# Patient Record
Sex: MALE | Race: WHITE | NOT HISPANIC OR LATINO | Employment: FULL TIME | URBAN - METROPOLITAN AREA
[De-identification: names, ages, dates, MRNs, and addresses within clinical notes are randomized per-mention and may not be internally consistent; named-entity substitution may affect disease eponyms.]

---

## 2022-12-31 ENCOUNTER — APPOINTMENT (OUTPATIENT)
Dept: RADIOLOGY | Facility: CLINIC | Age: 37
End: 2022-12-31
Attending: EMERGENCY MEDICINE
Payer: COMMERCIAL

## 2022-12-31 ENCOUNTER — HOSPITAL ENCOUNTER (EMERGENCY)
Facility: CLINIC | Age: 37
Discharge: HOME | End: 2022-12-31
Attending: EMERGENCY MEDICINE | Admitting: EMERGENCY MEDICINE
Payer: COMMERCIAL

## 2022-12-31 VITALS
WEIGHT: 265 LBS | DIASTOLIC BLOOD PRESSURE: 88 MMHG | OXYGEN SATURATION: 97 % | TEMPERATURE: 98.8 F | BODY MASS INDEX: 32.95 KG/M2 | RESPIRATION RATE: 18 BRPM | HEART RATE: 75 BPM | SYSTOLIC BLOOD PRESSURE: 147 MMHG | HEIGHT: 75 IN

## 2022-12-31 DIAGNOSIS — R07.89 ATYPICAL CHEST PAIN: ICD-10-CM

## 2022-12-31 LAB
ALBUMIN SERPL-MCNC: 4.1 G/DL (ref 3.5–5)
ALP SERPL-CCNC: 81 U/L (ref 45–120)
ALT SERPL W P-5'-P-CCNC: 19 U/L (ref 0–45)
ANION GAP SERPL CALCULATED.3IONS-SCNC: 12 MMOL/L (ref 5–18)
AST SERPL W P-5'-P-CCNC: 16 U/L (ref 0–40)
BASOPHILS # BLD AUTO: 0 10E3/UL (ref 0–0.2)
BASOPHILS NFR BLD AUTO: 1 %
BILIRUB DIRECT SERPL-MCNC: 0.1 MG/DL
BILIRUB SERPL-MCNC: 0.4 MG/DL (ref 0–1)
BUN SERPL-MCNC: 8 MG/DL (ref 8–22)
CALCIUM SERPL-MCNC: 10.2 MG/DL (ref 8.5–10.5)
CHLORIDE BLD-SCNC: 103 MMOL/L (ref 98–107)
CO2 SERPL-SCNC: 26 MMOL/L (ref 22–31)
CREAT SERPL-MCNC: 0.85 MG/DL (ref 0.7–1.3)
EOSINOPHIL # BLD AUTO: 0.2 10E3/UL (ref 0–0.7)
EOSINOPHIL NFR BLD AUTO: 3 %
ERYTHROCYTE [DISTWIDTH] IN BLOOD BY AUTOMATED COUNT: 13 % (ref 10–15)
GFR SERPL CREATININE-BSD FRML MDRD: >90 ML/MIN/1.73M2
GLUCOSE BLD-MCNC: 179 MG/DL (ref 70–125)
HCT VFR BLD AUTO: 44.3 % (ref 40–53)
HGB BLD-MCNC: 15.3 G/DL (ref 13.3–17.7)
IMM GRANULOCYTES # BLD: 0 10E3/UL
IMM GRANULOCYTES NFR BLD: 1 %
LIPASE SERPL-CCNC: 46 U/L (ref 0–52)
LYMPHOCYTES # BLD AUTO: 1.7 10E3/UL (ref 0.8–5.3)
LYMPHOCYTES NFR BLD AUTO: 26 %
MAGNESIUM SERPL-MCNC: 1.6 MG/DL (ref 1.8–2.6)
MCH RBC QN AUTO: 30.5 PG (ref 26.5–33)
MCHC RBC AUTO-ENTMCNC: 34.5 G/DL (ref 31.5–36.5)
MCV RBC AUTO: 88 FL (ref 78–100)
MONOCYTES # BLD AUTO: 0.5 10E3/UL (ref 0–1.3)
MONOCYTES NFR BLD AUTO: 7 %
NEUTROPHILS # BLD AUTO: 4.1 10E3/UL (ref 1.6–8.3)
NEUTROPHILS NFR BLD AUTO: 62 %
NRBC # BLD AUTO: 0 10E3/UL
NRBC BLD AUTO-RTO: 0 /100
PLATELET # BLD AUTO: 264 10E3/UL (ref 150–450)
POTASSIUM BLD-SCNC: 4 MMOL/L (ref 3.5–5)
PROT SERPL-MCNC: 7.8 G/DL (ref 6–8)
RBC # BLD AUTO: 5.01 10E6/UL (ref 4.4–5.9)
SODIUM SERPL-SCNC: 141 MMOL/L (ref 136–145)
TROPONIN I SERPL-MCNC: <0.01 NG/ML (ref 0–0.29)
WBC # BLD AUTO: 6.6 10E3/UL (ref 4–11)

## 2022-12-31 PROCEDURE — 83690 ASSAY OF LIPASE: CPT | Performed by: EMERGENCY MEDICINE

## 2022-12-31 PROCEDURE — 83735 ASSAY OF MAGNESIUM: CPT | Performed by: EMERGENCY MEDICINE

## 2022-12-31 PROCEDURE — 80053 COMPREHEN METABOLIC PANEL: CPT | Performed by: EMERGENCY MEDICINE

## 2022-12-31 PROCEDURE — 82310 ASSAY OF CALCIUM: CPT | Performed by: EMERGENCY MEDICINE

## 2022-12-31 PROCEDURE — 71046 X-RAY EXAM CHEST 2 VIEWS: CPT

## 2022-12-31 PROCEDURE — 36415 COLL VENOUS BLD VENIPUNCTURE: CPT | Performed by: EMERGENCY MEDICINE

## 2022-12-31 PROCEDURE — 85025 COMPLETE CBC W/AUTO DIFF WBC: CPT | Performed by: EMERGENCY MEDICINE

## 2022-12-31 PROCEDURE — 82248 BILIRUBIN DIRECT: CPT | Performed by: EMERGENCY MEDICINE

## 2022-12-31 PROCEDURE — 84484 ASSAY OF TROPONIN QUANT: CPT | Performed by: EMERGENCY MEDICINE

## 2022-12-31 PROCEDURE — 93005 ELECTROCARDIOGRAM TRACING: CPT | Performed by: EMERGENCY MEDICINE

## 2022-12-31 PROCEDURE — 99285 EMERGENCY DEPT VISIT HI MDM: CPT | Mod: 25

## 2022-12-31 ASSESSMENT — ENCOUNTER SYMPTOMS
HEMATURIA: 0
CONFUSION: 0
JOINT SWELLING: 0
COUGH: 0
ABDOMINAL PAIN: 0
DYSURIA: 0
VOMITING: 1
NAUSEA: 1
DIARRHEA: 0
SORE THROAT: 0
FEVER: 0
DIZZINESS: 0
CHILLS: 0
SHORTNESS OF BREATH: 1

## 2022-12-31 ASSESSMENT — ACTIVITIES OF DAILY LIVING (ADL): ADLS_ACUITY_SCORE: 35

## 2022-12-31 NOTE — ED NOTES
Introduced self to patient.  Whiteboard updated.  Plan of care and length of time discussed with patient.  Will continue to monitor. Ayla Gardner RN.......12/31/2022 2:21 PM

## 2022-12-31 NOTE — ED PROVIDER NOTES
"  Emergency Department Encounter     Evaluation Date & Time:   No admission date for patient encounter.    CHIEF COMPLAINT:  Chest Pain, Shortness of Breath, and Nausea & Vomiting      Triage Note:  Patient reports SOB, chest pain, nausea since Monday. Pain waxes and wanes, currently 8/10.                ED COURSE & MEDICAL DECISION MAKING:     ED Course as of 12/31/22 1557   Sat Dec 31, 2022   1247 CXR (independent interpretation): no acute cardiopulmonary process    1418 Trop neg with atypical symptoms for days. Will not pursue ACS further with repeat troponin testing.  Will refer to primary clinic.  Instructed on nsaids, lidocaine patches, return precautions.     1428 Pt remains well here, reassured by negative workup. Encouraged outpatient follow up and return precautions.       Pt here with left sided shoulder/chest pain starting Monday after waking up, worsening and constant now.  Had episode of nausea/vomiting Thursday, but not associated with pain or persistent.  Feels like he's \"gasping for breath\" at night occasionally during sleep, but otherwise no persistent dyspnea or pleuritic pain.  Chest pain is reproducible with palpation of chest wall and worse when he lies on right side with left arm hanging over. History is all suggestive of more benign process, unlikely ACS.  PERC negative, so I will not pursue PE further with d-diimer or CTA.  Will get labs, CXR.  EKG unremarkable.  Anticipate discharge with instructions for nsaids, otc lidocaine patches, outpatient follow up.    12:47 PM I met with the patient, obtained history, performed an initial exam, and discussed options and plan for diagnostics and treatment here in the ED.   2:26 PM I rechecked and updated the patient. Discussed discharge. Patient is agreeable.    At the conclusion of the encounter I discussed the results of all the tests and the disposition. The questions were answered. The patient or family acknowledged understanding and was " agreeable with the care plan.    Medical Decision Making    History:    Supplemental history from: Documented in HPI, if applicable    External Record(s) reviewed: Documented in HPI, if applicable.    Work Up:    Chart documentation includes differential considered and any EKGs or imaging independently interpreted by provider.    In additional to work up documented, I considered the following work up: See chart documentation, if applicable.    External consultation:    Discussion of management with another provider: See chart documentation, if applicable    Complicating factors:    Care impacted by chronic illness: N/A    Care affected by social determinants of health: N/A    Disposition considerations: Discharge. No recommendations on prescription strength medication(s). I considered admission, but ultimately discharged patient due to reassuring workup..         MEDICATIONS GIVEN IN THE EMERGENCY DEPARTMENT:  Medications - No data to display    NEW PRESCRIPTIONS STARTED AT TODAY'S ED VISIT:  There are no discharge medications for this patient.      HPI   The history is provided by the patient. No  was used.        Armando Hyde is a 37 year old male with a pertinent history of HTN and diabetes who presents to this ED via walk-in for evaluation of chest pain and shortness of breath.    Patient reports he woke up on Monday (6 days ago) with a sore left arm that began radiating into his mid chest that day and worsened throughout the week. Chest pain has been constant since, and he reports the chest pain is worse with laying on his right side, but it is okay when he is on his back. On Thursday, he had nausea and vomiting. He tested for COVID and tested negative. He reports his breathing has been weird, noting that he has been waking up gasping for air at times. No new cough, fever, abdominal pain, or leg pain. He takes losartan. He denies any prior cardiac issues. Patient has a family history  "of mother has had 2-3 heart attacks since her 40s, and his dad had a heart attack in his 60s. Both have stents placed. Patient denies a history of blood clots or recent travel. Patient denies a rash or any other current complaints.    REVIEW OF SYSTEMS:  Review of Systems   Constitutional: Negative for chills and fever.   HENT: Negative for sore throat.    Eyes: Negative for visual disturbance.   Respiratory: Positive for shortness of breath. Negative for cough.    Cardiovascular: Positive for chest pain.   Gastrointestinal: Positive for nausea and vomiting. Negative for abdominal pain and diarrhea.   Endocrine: Negative for polyuria.   Genitourinary: Negative for dysuria and hematuria.        - urinary changes     Musculoskeletal: Negative for joint swelling.        Positive for left arm pain. Negative for leg pain.   Skin: Negative for rash.   Neurological: Negative for dizziness.   Psychiatric/Behavioral: Negative for confusion.   All other systems reviewed and are negative.            Medical History   No past medical history on file.    Past Surgical History:   Procedure Laterality Date     NO PAST SURGERIES         Family History   Problem Relation Age of Onset     Nephrolithiasis Other        Social History     Tobacco Use     Smoking status: Never   Substance Use Topics     Alcohol use: No     Drug use: No       No current outpatient medications on file.      Physical Exam     Vitals:  BP (!) 147/88   Pulse 75   Temp 98.8  F (37.1  C) (Temporal)   Resp 18   Ht 1.905 m (6' 3\")   Wt 120.2 kg (265 lb)   SpO2 97%   BMI 33.12 kg/m      PHYSICAL EXAM:   Physical Exam  Vitals and nursing note reviewed.   Constitutional:       General: He is not in acute distress.     Appearance: Normal appearance.   HENT:      Head: Normocephalic and atraumatic.      Nose: Nose normal.      Mouth/Throat:      Mouth: Mucous membranes are moist.   Eyes:      Pupils: Pupils are equal, round, and reactive to light.   Neck:      " Vascular: No JVD.   Cardiovascular:      Rate and Rhythm: Normal rate and regular rhythm.      Pulses: Normal pulses.           Radial pulses are 2+ on the right side and 2+ on the left side.        Dorsalis pedis pulses are 2+ on the right side and 2+ on the left side.   Pulmonary:      Effort: Pulmonary effort is normal. No respiratory distress.      Breath sounds: Normal breath sounds.   Chest:      Chest wall: Tenderness present.      Comments: Left anterior chest wall tenderness to palpation. No rash.      Abdominal:      Palpations: Abdomen is soft.      Tenderness: There is no abdominal tenderness.   Musculoskeletal:      Cervical back: Full passive range of motion without pain and neck supple.      Comments: No calf tenderness or swelling b/l  Full range of motion to left arm.   Skin:     General: Skin is warm.      Findings: No rash.   Neurological:      General: No focal deficit present.      Mental Status: He is alert. Mental status is at baseline.      Comments: Fluent speech, no acute lateralizing deficits   Psychiatric:         Mood and Affect: Mood normal.         Behavior: Behavior normal.         Results     LAB:  All pertinent labs reviewed and interpreted  Labs Ordered and Resulted from Time of ED Arrival to Time of ED Departure   BASIC METABOLIC PANEL - Abnormal       Result Value    Sodium 141      Potassium 4.0      Chloride 103      Carbon Dioxide (CO2) 26      Anion Gap 12      Urea Nitrogen 8      Creatinine 0.85      Calcium 10.2      Glucose 179 (*)     GFR Estimate >90     MAGNESIUM - Abnormal    Magnesium 1.6 (*)    TROPONIN I - Normal    Troponin I <0.01     HEPATIC FUNCTION PANEL - Normal    Bilirubin Total 0.4      Bilirubin Direct 0.1      Protein Total 7.8      Albumin 4.1      Alkaline Phosphatase 81      AST 16      ALT 19     LIPASE - Normal    Lipase 46     CBC WITH PLATELETS AND DIFFERENTIAL    WBC Count 6.6      RBC Count 5.01      Hemoglobin 15.3      Hematocrit 44.3      MCV  88      MCH 30.5      MCHC 34.5      RDW 13.0      Platelet Count 264      % Neutrophils 62      % Lymphocytes 26      % Monocytes 7      % Eosinophils 3      % Basophils 1      % Immature Granulocytes 1      NRBCs per 100 WBC 0      Absolute Neutrophils 4.1      Absolute Lymphocytes 1.7      Absolute Monocytes 0.5      Absolute Eosinophils 0.2      Absolute Basophils 0.0      Absolute Immature Granulocytes 0.0      Absolute NRBCs 0.0         RADIOLOGY:  Chest XR,  PA & LAT   Final Result   IMPRESSION: Lungs are clear. No pleural effusion. Heart size and pulmonary vascularity within normal limits.                   ECG:  NSR, rate 91, normal intervals, no acute ischemia    I have independently reviewed and interpreted the EKG(s) documented above     PROCEDURES:  Procedures:  none      FINAL IMPRESSION:    ICD-10-CM    1. Atypical chest pain  R07.89           0 minutes of critical care time      I, Kate Neely, am serving as a scribe to document services personally performed by Dr. Mikel Claudio, based on my observations and the provider's statements to me. I, Mikel Claudio, DO attest that Kate Neely is acting in a scribe capacity, has observed my performance of the services and has documented them in accordance with my direction.      Mikel Claudio, DO  Emergency Medicine  [facility] EMERGENCY ROOM  12/31/2022  12:46 PM        Mikel Claudio MD  12/31/22 1008

## 2022-12-31 NOTE — DISCHARGE INSTRUCTIONS
Use over the counter lidocaine patches as directed daily.  Take tylenol for pain and ibuprofen 600mg 3 times a day for pain with food for next week or so.  Stretch/range of motion left arm and chest.  Follow up with primary clinic for ongoing evaluation and cares.  Symptoms should slowly resolve over next week or so. Return for new/worsening concerns.

## 2022-12-31 NOTE — ED TRIAGE NOTES
Patient reports SOB, chest pain, nausea since Monday. Pain waxes and wanes, currently 8/10.      Triage Assessment     Row Name 12/31/22 1224       Triage Assessment (Adult)    Airway WDL WDL       Respiratory WDL    Respiratory WDL X  SOB, dry cough       Skin Circulation/Temperature WDL    Skin Circulation/Temperature WDL WDL       Cardiac WDL    Cardiac WDL X  chest pain       Peripheral/Neurovascular WDL    Peripheral Neurovascular WDL WDL       Cognitive/Neuro/Behavioral WDL    Cognitive/Neuro/Behavioral WDL WDL              
Principal Discharge DX:	Cavitary lesion of lung  Secondary Diagnosis:	Chest pain, unspecified type

## 2023-01-03 LAB
ATRIAL RATE - MUSE: 91 BPM
DIASTOLIC BLOOD PRESSURE - MUSE: NORMAL MMHG
INTERPRETATION ECG - MUSE: NORMAL
P AXIS - MUSE: 49 DEGREES
PR INTERVAL - MUSE: 156 MS
QRS DURATION - MUSE: 92 MS
QT - MUSE: 348 MS
QTC - MUSE: 428 MS
R AXIS - MUSE: 51 DEGREES
SYSTOLIC BLOOD PRESSURE - MUSE: NORMAL MMHG
T AXIS - MUSE: 22 DEGREES
VENTRICULAR RATE- MUSE: 91 BPM

## 2025-02-08 ENCOUNTER — HOSPITAL ENCOUNTER (EMERGENCY)
Facility: HOSPITAL | Age: 40
Discharge: HOME | End: 2025-02-08
Attending: EMERGENCY MEDICINE | Admitting: EMERGENCY MEDICINE
Payer: COMMERCIAL

## 2025-02-08 VITALS
BODY MASS INDEX: 32.5 KG/M2 | DIASTOLIC BLOOD PRESSURE: 77 MMHG | TEMPERATURE: 98.9 F | RESPIRATION RATE: 20 BRPM | HEART RATE: 70 BPM | OXYGEN SATURATION: 98 % | SYSTOLIC BLOOD PRESSURE: 128 MMHG | WEIGHT: 260 LBS

## 2025-02-08 DIAGNOSIS — S01.81XA LACERATION OF FOREHEAD, INITIAL ENCOUNTER: ICD-10-CM

## 2025-02-08 PROCEDURE — 250N000009 HC RX 250: Performed by: EMERGENCY MEDICINE

## 2025-02-08 PROCEDURE — 12001 RPR S/N/AX/GEN/TRNK 2.5CM/<: CPT

## 2025-02-08 PROCEDURE — 99283 EMERGENCY DEPT VISIT LOW MDM: CPT | Mod: 25

## 2025-02-08 RX ADMIN — Medication: at 18:50

## 2025-02-08 ASSESSMENT — COLUMBIA-SUICIDE SEVERITY RATING SCALE - C-SSRS
2. HAVE YOU ACTUALLY HAD ANY THOUGHTS OF KILLING YOURSELF IN THE PAST MONTH?: NO
6. HAVE YOU EVER DONE ANYTHING, STARTED TO DO ANYTHING, OR PREPARED TO DO ANYTHING TO END YOUR LIFE?: NO
1. IN THE PAST MONTH, HAVE YOU WISHED YOU WERE DEAD OR WISHED YOU COULD GO TO SLEEP AND NOT WAKE UP?: NO

## 2025-02-08 ASSESSMENT — ACTIVITIES OF DAILY LIVING (ADL)
ADLS_ACUITY_SCORE: 41
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
## 2025-02-09 NOTE — DISCHARGE INSTRUCTIONS
Apply bacitracin.  If signs of infection redness swelling or drainage or concerning symptoms regarding the head injury including headache not improved with Tylenol, vomiting or change in mental status return to the emergency department.  Your blood pressure was noted to be elevated here in the emergency department probably secondary to the injury, pain emergency department setting.  You should have this rechecked at primary care follow-up when you have your sutures removed.